# Patient Record
Sex: MALE | Employment: FULL TIME | URBAN - METROPOLITAN AREA
[De-identification: names, ages, dates, MRNs, and addresses within clinical notes are randomized per-mention and may not be internally consistent; named-entity substitution may affect disease eponyms.]

---

## 2018-09-02 ENCOUNTER — HOSPITAL ENCOUNTER (EMERGENCY)
Age: 38
Discharge: HOME | End: 2018-09-02
Payer: COMMERCIAL

## 2018-09-02 ENCOUNTER — APPOINTMENT (OUTPATIENT)
Dept: CT IMAGING | Age: 38
End: 2018-09-02
Payer: COMMERCIAL

## 2018-09-02 VITALS
WEIGHT: 184 LBS | HEIGHT: 66 IN | RESPIRATION RATE: 16 BRPM | OXYGEN SATURATION: 99 % | TEMPERATURE: 98.2 F | SYSTOLIC BLOOD PRESSURE: 121 MMHG | HEART RATE: 74 BPM | DIASTOLIC BLOOD PRESSURE: 71 MMHG | BODY MASS INDEX: 29.57 KG/M2

## 2018-09-02 DIAGNOSIS — M54.6 ACUTE MIDLINE THORACIC BACK PAIN: Primary | ICD-10-CM

## 2018-09-02 DIAGNOSIS — R20.2 LEFT HAND PARESTHESIA: ICD-10-CM

## 2018-09-02 DIAGNOSIS — H53.8 BLURRED VISION, BILATERAL: ICD-10-CM

## 2018-09-02 LAB
ALBUMIN SERPL-MCNC: 3.9 G/DL (ref 3.5–5)
ALBUMIN/GLOB SERPL: 1.3 {RATIO} (ref 1.2–3.5)
ALP SERPL-CCNC: 77 U/L (ref 50–136)
ALT SERPL-CCNC: 26 U/L (ref 12–65)
ANION GAP SERPL CALC-SCNC: 4 MMOL/L (ref 7–16)
AST SERPL-CCNC: 16 U/L (ref 15–37)
BASOPHILS # BLD: 0 K/UL (ref 0–0.2)
BASOPHILS NFR BLD: 1 % (ref 0–2)
BILIRUB SERPL-MCNC: 0.3 MG/DL (ref 0.2–1.1)
BUN SERPL-MCNC: 15 MG/DL (ref 6–23)
CALCIUM SERPL-MCNC: 8.7 MG/DL (ref 8.3–10.4)
CHLORIDE SERPL-SCNC: 106 MMOL/L (ref 98–107)
CO2 SERPL-SCNC: 30 MMOL/L (ref 21–32)
CREAT SERPL-MCNC: 0.93 MG/DL (ref 0.8–1.5)
DIFFERENTIAL METHOD BLD: NORMAL
EOSINOPHIL # BLD: 0.1 K/UL (ref 0–0.8)
EOSINOPHIL NFR BLD: 1 % (ref 0.5–7.8)
ERYTHROCYTE [DISTWIDTH] IN BLOOD BY AUTOMATED COUNT: 12.1 %
GLOBULIN SER CALC-MCNC: 3.1 G/DL (ref 2.3–3.5)
GLUCOSE SERPL-MCNC: 111 MG/DL (ref 65–100)
HCT VFR BLD AUTO: 41.2 % (ref 41.1–50.3)
HGB BLD-MCNC: 13.9 G/DL (ref 13.6–17.2)
IMM GRANULOCYTES # BLD: 0 K/UL (ref 0–0.5)
IMM GRANULOCYTES NFR BLD AUTO: 0 % (ref 0–5)
LYMPHOCYTES # BLD: 1.3 K/UL (ref 0.5–4.6)
LYMPHOCYTES NFR BLD: 19 % (ref 13–44)
MAGNESIUM SERPL-MCNC: 2.2 MG/DL (ref 1.8–2.4)
MCH RBC QN AUTO: 28.8 PG (ref 26.1–32.9)
MCHC RBC AUTO-ENTMCNC: 33.7 G/DL (ref 31.4–35)
MCV RBC AUTO: 85.3 FL (ref 79.6–97.8)
MONOCYTES # BLD: 0.7 K/UL (ref 0.1–1.3)
MONOCYTES NFR BLD: 10 % (ref 4–12)
NEUTS SEG # BLD: 4.7 K/UL (ref 1.7–8.2)
NEUTS SEG NFR BLD: 69 % (ref 43–78)
NRBC # BLD: 0 K/UL (ref 0–0.2)
PLATELET # BLD AUTO: 240 K/UL (ref 150–450)
PMV BLD AUTO: 11.4 FL (ref 9.4–12.3)
POTASSIUM SERPL-SCNC: 4.2 MMOL/L (ref 3.5–5.1)
PROT SERPL-MCNC: 7 G/DL (ref 6.3–8.2)
RBC # BLD AUTO: 4.83 M/UL (ref 4.23–5.6)
SODIUM SERPL-SCNC: 140 MMOL/L (ref 136–145)
WBC # BLD AUTO: 6.8 K/UL (ref 4.3–11.1)

## 2018-09-02 PROCEDURE — 70450 CT HEAD/BRAIN W/O DYE: CPT

## 2018-09-02 PROCEDURE — 72128 CT CHEST SPINE W/O DYE: CPT

## 2018-09-02 PROCEDURE — 81003 URINALYSIS AUTO W/O SCOPE: CPT

## 2018-09-02 PROCEDURE — 85025 COMPLETE CBC W/AUTO DIFF WBC: CPT

## 2018-09-02 PROCEDURE — 80053 COMPREHEN METABOLIC PANEL: CPT

## 2018-09-02 PROCEDURE — 99284 EMERGENCY DEPT VISIT MOD MDM: CPT

## 2018-09-02 PROCEDURE — 83735 ASSAY OF MAGNESIUM: CPT

## 2018-09-02 RX ORDER — CYCLOBENZAPRINE HCL 10 MG
10 TABLET ORAL
Qty: 14 TAB | Refills: 0 | Status: SHIPPED | OUTPATIENT
Start: 2018-09-02 | End: 2018-09-02

## 2018-09-02 RX ORDER — PREDNISONE 10 MG/1
TABLET ORAL
Qty: 21 TAB | Refills: 0 | Status: SHIPPED | OUTPATIENT
Start: 2018-09-02

## 2018-09-02 RX ORDER — CYCLOBENZAPRINE HCL 10 MG
10 TABLET ORAL
Qty: 14 TAB | Refills: 0 | Status: SHIPPED | OUTPATIENT
Start: 2018-09-02

## 2018-09-02 RX ORDER — PREDNISONE 10 MG/1
TABLET ORAL
Qty: 21 TAB | Refills: 0 | Status: SHIPPED | OUTPATIENT
Start: 2018-09-02 | End: 2018-09-02

## 2018-09-02 NOTE — ED NOTES
Patient complaining of mid to lower back pain for \"few days\", however this morning woke up around 0500 and noted some numbness and tingling to left hand.

## 2018-09-02 NOTE — DISCHARGE INSTRUCTIONS
Learning About Relief for Back Pain  What is back tension and strain? Back strain happens when you overstretch, or pull, a muscle in your back. You may hurt your back in an accident or when you exercise or lift something. Most back pain will get better with rest and time. You can take care of yourself at home to help your back heal.  What can you do first to relieve back pain? When you first feel back pain, try these steps:  · Walk. Take a short walk (10 to 20 minutes) on a level surface (no slopes, hills, or stairs) every 2 to 3 hours. Walk only distances you can manage without pain, especially leg pain. · Relax. Find a comfortable position for rest. Some people are comfortable on the floor or a medium-firm bed with a small pillow under their head and another under their knees. Some people prefer to lie on their side with a pillow between their knees. Don't stay in one position for too long. · Try heat or ice. Try using a heating pad on a low or medium setting, or take a warm shower, for 15 to 20 minutes every 2 to 3 hours. Or you can buy single-use heat wraps that last up to 8 hours. You can also try an ice pack for 10 to 15 minutes every 2 to 3 hours. You can use an ice pack or a bag of frozen vegetables wrapped in a thin towel. There is not strong evidence that either heat or ice will help, but you can try them to see if they help. You may also want to try switching between heat and cold. · Take pain medicine exactly as directed. ¨ If the doctor gave you a prescription medicine for pain, take it as prescribed. ¨ If you are not taking a prescription pain medicine, ask your doctor if you can take an over-the-counter medicine. What else can you do? · Stretch and exercise. Exercises that increase flexibility may relieve your pain and make it easier for your muscles to keep your spine in a good, neutral position. And don't forget to keep walking. · Do self-massage.  You can use self-massage to unwind after work or school or to energize yourself in the morning. You can easily massage your feet, hands, or neck. Self-massage works best if you are in comfortable clothes and are sitting or lying in a comfortable position. Use oil or lotion to massage bare skin. · Reduce stress. Back pain can lead to a vicious [place]: Distress about the pain tenses the muscles in your back, which in turn causes more pain. Learn how to relax your mind and your muscles to lower your stress. Where can you learn more? Go to http://nilay-breanna.info/. Enter S373 in the search box to learn more about \"Learning About Relief for Back Pain. \"  Current as of: March 21, 2017  Content Version: 11.5  © 4034-2573 [employer] Care instructions adapted under license by [employer] (which disclaims liability or warranty for this information). If you have questions about a medical condition or this instruction, always ask your healthcare professional. [place] [address] any warranty or liability for your use of this information. Numbness and Tingling: Care Instructions  Your Care Instructions    Many things can cause numbness or tingling. Swelling may put pressure on a nerve. This could cause you to lose feeling or have a pins-and-needles sensation on part of your body. Nerves may be damaged from trauma, toxins, or diseases, such as diabetes or multiple sclerosis (MS). Sometimes, though, the cause is not clear. If there is no clear reason for your symptoms, and you are not having any other symptoms, your doctor may suggest watching and waiting for a while to see if the numbness or tingling goes away on its own. Your doctor may want you to have blood or nerve tests to find the cause of your symptoms. Follow-up care is a key part of your treatment and safety. Be sure to make and go to all appointments, and call your doctor if you are having problems.  It's also a good idea to know your test results and keep a list of the medicines you take. How can you care for yourself at home? · If your doctor prescribes medicine, take it exactly as directed. Call your doctor if you think you are having a problem with your medicine. · If you have any swelling, put ice or a cold pack on the area for 10 to 20 minutes at a time. Put a thin cloth between the ice and your skin. When should you call for help? Call 911 anytime you think you may need emergency care. For example, call if:    · You have weakness, numbness, or tingling in both legs.     · You lose bowel or bladder control.     · You have symptoms of a stroke. These may include:  ¨ Sudden numbness, tingling, weakness, or loss of movement in your face, arm, or leg, especially on only one side of your body. ¨ Sudden vision changes. ¨ Sudden trouble speaking. ¨ Sudden confusion or trouble understanding simple statements. ¨ Sudden problems with walking or balance. ¨ A sudden, severe headache that is different from past headaches.    Watch closely for changes in your health, and be sure to contact your doctor if you have any problems, or if:    · You do not get better as expected. Where can you learn more? Go to http://nilay-breanna.info/. Enter N083 in the search box to learn more about \"Numbness and Tingling: Care Instructions. \"  Current as of: September 10, 2017  Content Version: 11.7  © 4091-0937 [employer]. Care instructions adapted under license by [employer] (which disclaims liability or warranty for this information). If you have questions about a medical condition or this instruction, always ask your healthcare professional. [address] any warranty or liability for your use of this information. Numbness and Tingling: Care Instructions  Your Care Instructions    Many things can cause numbness or tingling. Swelling may put pressure on a nerve.  This could cause you to lose feeling or have a pins-and-needles sensation on part of your body. Nerves may be damaged from trauma, toxins, or diseases, such as diabetes or multiple sclerosis (MS). Sometimes, though, the cause is not clear. If there is no clear reason for your symptoms, and you are not having any other symptoms, your doctor may suggest watching and waiting for a while to see if the numbness or tingling goes away on its own. Your doctor may want you to have blood or nerve tests to find the cause of your symptoms. Follow-up care is a key part of your treatment and safety. Be sure to make and go to all appointments, and call your doctor if you are having problems. It's also a good idea to know your test results and keep a list of the medicines you take. How can you care for yourself at home? · If your doctor prescribes medicine, take it exactly as directed. Call your doctor if you think you are having a problem with your medicine. · If you have any swelling, put ice or a cold pack on the area for 10 to 20 minutes at a time. Put a thin cloth between the ice and your skin. When should you call for help? Call 911 anytime you think you may need emergency care. For example, call if:    · You have weakness, numbness, or tingling in both legs.     · You lose bowel or bladder control.     · You have symptoms of a stroke. These may include:  ¨ Sudden numbness, tingling, weakness, or loss of movement in your face, arm, or leg, especially on only one side of your body. ¨ Sudden vision changes. ¨ Sudden trouble speaking. ¨ Sudden confusion or trouble understanding simple statements. ¨ Sudden problems with walking or balance. ¨ A sudden, severe headache that is different from past headaches.    Watch closely for changes in your health, and be sure to contact your doctor if you have any problems, or if:    · You do not get better as expected. Where can you learn more?   Go to http://nilay-breanna.info/. Enter W423 in the search box to learn more about \"Numbness and Tingling: Care Instructions. \"  Current as of: September 10, 2017  Content Version: 11.7  © 4219-0178 [employer]. Care instructions adapted under license by [employer] (which disclaims liability or warranty for this information). If you have questions about a medical condition or this instruction, always ask your healthcare professional. [address] any warranty or liability for your use of this information. Numbness and Tingling: Care Instructions  Your Care Instructions    Many things can cause numbness or tingling. Swelling may put pressure on a nerve. This could cause you to lose feeling or have a pins-and-needles sensation on part of your body. Nerves may be damaged from trauma, toxins, or diseases, such as diabetes or multiple sclerosis (MS). Sometimes, though, the cause is not clear. If there is no clear reason for your symptoms, and you are not having any other symptoms, your doctor may suggest watching and waiting for a while to see if the numbness or tingling goes away on its own. Your doctor may want you to have blood or nerve tests to find the cause of your symptoms. Follow-up care is a key part of your treatment and safety. Be sure to make and go to all appointments, and call your doctor if you are having problems. It's also a good idea to know your test results and keep a list of the medicines you take. How can you care for yourself at home? · If your doctor prescribes medicine, take it exactly as directed. Call your doctor if you think you are having a problem with your medicine. · If you have any swelling, put ice or a cold pack on the area for 10 to 20 minutes at a time. Put a thin cloth between the ice and your skin. When should you call for help? Call 911 anytime you think you may need emergency care.  For example, call if:    · You have weakness, numbness, or tingling in both legs.     · You lose bowel or bladder control.     · You have symptoms of a stroke. These may include:  ¨ Sudden numbness, tingling, weakness, or loss of movement in your face, arm, or leg, especially on only one side of your body. ¨ Sudden vision changes. ¨ Sudden trouble speaking. ¨ Sudden confusion or trouble understanding simple statements. ¨ Sudden problems with walking or balance. ¨ A sudden, severe headache that is different from past headaches.    Watch closely for changes in your health, and be sure to contact your doctor if you have any problems, or if:    · You do not get better as expected. Where can you learn more? Go to http://nilay-breanna.info/. Enter X514 in the search box to learn more about \"Numbness and Tingling: Care Instructions. \"  Current as of: September 10, 2017  Content Version: 11.7  © 5651-0480 [employer]. Care instructions adapted under license by [employer] (which disclaims liability or warranty for this information). If you have questions about a medical condition or this instruction, always ask your healthcare professional. [place] [address] any warranty or liability for your use of this information.

## 2018-09-02 NOTE — ED NOTES
I have reviewed discharge instructions with the patient. The patient verbalized understanding. Patient left ED via Discharge Method: ambulatory to Home with (self). Opportunity for questions and clarification provided. Patient given 2 scripts. To continue your aftercare when you leave the hospital, you may receive an automated call from our care team to check in on how you are doing. This is a free service and part of our promise to provide the best care and service to meet your aftercare needs.  If you have questions, or wish to unsubscribe from this service please call [phone]. Thank you for Choosing our [facility].

## 2018-09-02 NOTE — ED PROVIDER NOTES
HPI Comments: 68-year-old complaining of mid back pain and left hand numbness. He also complains of headache blurred vision onset and gradual last 2 weeks. No injuries no MVC. 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 Patient is ambidextrous. Patient is a 40 y.o. male presenting with back pain. The history is provided by the patient. Back Pain This is a new problem. The current episode started more than 1 week ago. The problem has not changed since onset. The problem occurs constantly. Patient reports not work related injury. The pain is associated with no known injury. The pain is present in the thoracic spine. The pain is moderate. The symptoms are aggravated by bending. Associated symptoms include paresthesias. Pertinent negatives include no chest pain, no fever, no numbness, no weight loss, no abdominal pain, no abdominal swelling, no bowel incontinence and no perianal numbness. He has tried nothing for the symptoms. The patient's surgical history non-contributory History reviewed. No pertinent past medical history. History reviewed. No pertinent surgical history. History reviewed. No pertinent family history. Social History Social History  Marital status:  Spouse name: N/A  
 Number of children: N/A  
 Years of education: N/A Occupational History  Not on file. Social History Main Topics  Smoking status: Light Tobacco Smoker  Smokeless tobacco: Never Used  Alcohol use Yes Comment: social  
 Drug use: No  
 Sexual activity: Not on file Other Topics Concern  Not on file Social History Narrative ** Merged History Encounter ** ALLERGIES: Review of patient's allergies indicates no known allergies. Review of Systems Constitutional: Negative. Negative for activity change, fever and weight loss. HENT: Negative. Eyes: Negative. Respiratory: Negative. Cardiovascular: Negative. Negative for chest pain. Gastrointestinal: Negative. Negative for abdominal pain and bowel incontinence. Genitourinary: Negative. Musculoskeletal: Positive for back pain. Skin: Negative. Neurological: Positive for paresthesias. Negative for numbness. Psychiatric/Behavioral: Negative. All other systems reviewed and are negative. Vitals:  
 09/02/18 1312 BP: 128/80 Pulse: 70 Resp: 16 Temp: 97.7 °F (36.5 °C) SpO2: 99% Weight: 83.5 kg (184 lb) Height: 5' 6\" (1.676 m) Physical Exam  
Constitutional: He is oriented to person, place, and time. He appears well-developed and well-nourished. No distress. HENT:  
Head: Normocephalic and atraumatic. Right Ear: External ear normal.  
Left Ear: External ear normal.  
Nose: Nose normal.  
Mouth/Throat: Oropharynx is clear and moist. No oropharyngeal exudate. Eyes: Conjunctivae and EOM are normal. Pupils are equal, round, and reactive to light. Right eye exhibits no discharge. Left eye exhibits no discharge. No scleral icterus. Neck: Normal range of motion. Neck supple. No JVD present. No tracheal deviation present. Cardiovascular: Normal rate, regular rhythm and intact distal pulses. Pulmonary/Chest: Effort normal and breath sounds normal. No stridor. No respiratory distress. He has no wheezes. He exhibits no tenderness. Abdominal: Soft. Bowel sounds are normal. He exhibits no distension and no mass. There is no tenderness. Musculoskeletal: Normal range of motion. He exhibits no edema or tenderness. Neurological: He is alert and oriented to person, place, and time. No cranial nerve deficit. Skin: Skin is warm and dry. No rash noted. He is not diaphoretic. No erythema. No pallor. Psychiatric: He has a normal mood and affect. His behavior is normal. Thought content normal.  
Nursing note and vitals reviewed. MDM 
 
 
ED Course Procedures

## 2019-01-14 ENCOUNTER — HOSPITAL ENCOUNTER (OUTPATIENT)
Dept: MRI IMAGING | Age: 39
Discharge: HOME | End: 2019-01-14
Attending: INTERNAL MEDICINE
Payer: COMMERCIAL

## 2019-01-14 DIAGNOSIS — M54.2 NECK PAIN: ICD-10-CM

## 2019-01-14 DIAGNOSIS — M54.12 CERVICAL RADICULOPATHY: ICD-10-CM

## 2019-01-14 DIAGNOSIS — M54.6 THORACIC SPINE PAIN: ICD-10-CM

## 2019-01-14 PROCEDURE — 72141 MRI NECK SPINE W/O DYE: CPT

## 2019-01-14 PROCEDURE — 72146 MRI CHEST SPINE W/O DYE: CPT

## 2019-05-07 ENCOUNTER — HOSPITAL ENCOUNTER (OUTPATIENT)
Dept: PHYSICAL THERAPY | Age: 39
Discharge: HOME | End: 2019-05-07
Payer: COMMERCIAL

## 2019-05-07 PROCEDURE — 97161 PT EVAL LOW COMPLEX 20 MIN: CPT

## 2019-05-07 PROCEDURE — 97014 ELECTRIC STIMULATION THERAPY: CPT

## 2019-05-09 NOTE — PROGRESS NOTES
Anat Ash  : 1980  Primary: [address]  Secondary:  [address]  at [facility]  [address], [address], [address] [address]  [phone]   [phone]        OUTPATIENT PHYSICAL THERAPY: Daily Treatment Note 2019  Pre-treatment Symptoms/Complaints:  Patient reports fairly constant upper and mid back pain, rated as 8/10 at worst. He reports prolonged activity, bending, and lifting will increase his symptoms. Pain: Initial: Pain Intensity 1: 5  Pain Location 1: Back  Post Session:  3-4/10   Medications Last Reviewed:  2019    Updated Objective Findings:  See evaluation note from today   TREATMENT:     MODALITIES: (15 minutes): *  Electrical Stimulation Therapy (IFC set-up, intersecting at T4-6 region, patient prone) was provided with intensity adjusted throughout treatment to patient tolerance. Patient responded well to treatment, skin clear afterwards     THERAPEUTIC EXERCISE: (-- minutes):  Exercises per grid below to improve mobility and strength. Required minimal verbal cues to promote proper body alignment, promote proper body posture and promote proper body mechanics. Progressed complexity of movement as indicated. Date:   Date:   Date:     Activity/Exercise Parameters Parameters Parameters                                                 Treatment/Session Summary:    · Response to Treatment:  Patient verbalized understanding of plan of care and responded well to e-stim today, noting decreased pain.   · Communication/Consultation:  plan of care discussion,   · Equipment provided today:  None today  · Recommendations/Intent for next treatment session: Next visit will focus on thoracic mobility and scapular/postural strengthening   Treatment Plan of Care Effective Dates:  2019 TO 2019   Total Treatment Billable Duration:  40 minutes - 25 minute initial evaluation and 15 minute electrical stimulation unattended   PT Patient Time In/Time Out  Time In: 0815  Time Out: 0900  Nya Hollins, PT, DPT    Future Appointments   Date Time Provider [place]   5/13/2019  9:00 AM [facility]   5/15/2019  8:15 AM [place] Prows[facility]   5/20/2019  8:15 AM [facility]   5/22/2019  8:15 AM Nathan Rainesws, PT, DPT [facility]   5/29/2019  8:15 AM [facility]ws[facility]   6/3/2019  8:15 AM [facility]owalski, [facility]   6/5/2019  8:15 AM Terrence Gilbert PT, DPT [facility]

## 2019-05-09 NOTE — THERAPY EVALUATION
Fazal Lopez  : 1980  Primary: [address]  Secondary:  [address]  at [facility]  [address], [address], [place]  [phone]   [phone]          OUTPATIENT PHYSICAL THERAPY:Initial Assessment 2019   ICD-10: Treatment Diagnosis: Pain in thoracic spine (M54.6)  Precautions/Allergies:   Patient has no known allergies. MD Orders: evaluate and treat MEDICAL/REFERRING DIAGNOSIS:  Pain in thoracic spine [M54.6]   DATE OF ONSET: Chronic   REFERRING PHYSICIAN: Arielle Brownlee[address]: unknown      INITIAL ASSESSMENT:  Mr. Kym Rodriguez presents in therapy today with chronic reports of upper and mid central back pain, which refers to B/L scapular region. He will benefit from skilled PT in order to improve pain and mobility in the spine for optimum performance of ADLs and work duties. PROBLEM LIST (Impacting functional limitations):  1. Decreased Strength  2. Decreased ADL/Functional Activities  3. Increased Pain  4. Decreased Activity Tolerance  5. Decreased Flexibility/Joint Mobility  6. Decreased Knowledge of Precautions  7. Decreased [place] with Home Exercise Program INTERVENTIONS PLANNED: (Treatment may consist of any combination of the following)  1. Cold  2. Electrical Stimulation  3. Heat  4. Home Exercise Program (HEP)  5. Manual Therapy  6. Range of Motion (ROM)  7. Therapeutic Activites  8. Therapeutic Exercise/Strengthening   TREATMENT PLAN:  Effective Dates: 2019 TO 2019 (60 days). Frequency/Duration: 2 times a week for 60 Day(s)  GOALS: (Goals have been discussed and agreed upon with patient.)    SHORT-TERM FUNCTIONAL GOALS: Time Frame: 3 weeks  1. Patient will be compliant with HEP focused on thoracic mobility and strength/ROM. 2.  Patient will rate mid back pain no greater than 6/10 at worst for improved tolerance to daily and work activities.     DISCHARGE GOALS: Time Frame: 6 weeks  1. Patient will be independent with comprehensive HEP focused on lumbar stabilization and core strengthening. 2.  Patient will rate mid back pain no greater than 3/10 and which does not significantly interfere with daily or work duties. 3.  Patient will demonstrate thoracic AROM to be [facility] for improved safety with functional mobility. REHABILITATION POTENTIAL FOR STATED GOALS: Good       Rehabilitation Potential For Stated Goals: Good  Regarding Mari Rob's therapy, I certify that the treatment plan above will be carried out by a therapist or under their direction. Thank you for this referral,  Deann Dinero, PT, DPT     Referring Physician Signature: Km Walsh,               Date                    The information in this section was collected on 5-7-19 (except where otherwise noted). HISTORY:   History of Present Injury/Illness (Reason for Referral):  Patient reports chronic upper and mid back pain, beginning years ago from unknown cause. Had recent negative x-ray imaging for the spine. He was diagnosed with muscle strain in the upper and mid back. Past Medical History/Comorbidities:   Mr. Sheryl Up  has no past medical history on file. Mr. Sheryl Up  has no past surgical history on file. Social History/Living Environment:     Independent   Prior Level of Function/Work/Activity:  Works as hart   Personal Factors:          Sex:  male        Age:  45 y.o. Profession:  Frequent bending as hart      Ambulatory/Rehab Services H2 Model Falls Risk Assessment    Risk Factors:       (1)  Gender [Male] Ability to Rise from Chair:       (0)  Ability to rise in a single movement    Falls Prevention Plan:       No modifications necessary   Total: (5 or greater = High Risk): 1    ©2010 [facility] "[employer] All Rights Reserved. [facility] Patent #[id number].  Federal Law prohibits the replication, distribution or use without written permission from [employer]     Current Medications:       Current Outpatient Medications:     predniSONE (STERAPRED DS) 10 mg dose pack, Please take as directed on package. Please clarify any questions with the Pharmacist., Disp: 21 Tab, Rfl: 0    cyclobenzaprine (FLEXERIL) 10 mg tablet, Take 1 Tab by mouth three (3) times daily as needed for Muscle Spasm(s). , Disp: 14 Tab, Rfl: 0   Date Last Reviewed:  5/9/2019     Number of Personal Factors/Comorbidities that affect the Plan of Care: 0: LOW COMPLEXITY   EXAMINATION:   Observation/Orthostatic Postural Assessment:          Patient demonstrates forward lean and increased thoracic kyphosis, bilaterally rounded shoulders and forward head  Palpation:          Tenderness with central PA mobilizations T4-8; bilaterally right paraspinals at these levels   ROM:          Thoracic AROM is 50% in all planes; lumbar AROM is [place]  Strength:          Thoracic side bend and rotation are 4/5   Body Structures Involved:  1. Bones  2. Joints  3. Muscles Body Functions Affected:  1. Sensory/Pain  2. Movement Related Activities and Participation Affected:  1. General Tasks and Demands  2. Mobility  3. Domestic Life  4. Interpersonal Interactions and Relationships  5. Community, Social and [place]   Number of elements (examined above) that affect the Plan of Care: 1-2: LOW COMPLEXITY   CLINICAL PRESENTATION:   Presentation: Stable and uncomplicated: LOW COMPLEXITY   CLINICAL DECISION MAKING:   Outcome Measure: Tool Used: Modified Oswestry Low Back Pain Questionnaire  Score:  Initial: 21/50  Most Recent: X/50 (Date: -- )   Interpretation of Score: Each section is scored on a 0-5 scale, 5 representing the greatest disability. The scores of each section are added together for a total score of 50. Medical Necessity:   · Patient is expected to demonstrate progress in strength and range of motion to improve safety during functional mobility.   Reason for Services/Other Comments:  · Patient continues to require skilled intervention due to not reaching long term goals.    Use of outcome tool(s) and clinical judgement create a POC that gives a: Clear prediction of patient's progress: LOW COMPLEXITY

## 2019-05-13 ENCOUNTER — HOSPITAL ENCOUNTER (OUTPATIENT)
Dept: PHYSICAL THERAPY | Age: 39
Discharge: HOME | End: 2019-05-13
Payer: COMMERCIAL

## 2019-05-13 PROCEDURE — 97014 ELECTRIC STIMULATION THERAPY: CPT

## 2019-05-13 PROCEDURE — 97110 THERAPEUTIC EXERCISES: CPT

## 2019-05-13 NOTE — PROGRESS NOTES
Sal Portillo  : 1980  Primary: [address]*  Secondary:  [address] at [address], [place] [address]  [phone]   [phone]        OUTPATIENT PHYSICAL THERAPY: Daily Treatment Note 2019  Pre-treatment Symptoms/Complaints:  Patient reports his back hurts when he is busy and working hard. Pain: Initial: Pain Intensity 1: 5  Pain Location 1: Back  Post Session:  3-4/10   Medications Last Reviewed:  2019    Updated Objective Findings:  See evaluation note from today   TREATMENT:     MODALITIES: (10 minutes): *  Electrical Stimulation Therapy (IFC set-up, intersecting at T4-6 region, patient prone) was provided with intensity adjusted throughout treatment to patient tolerance. Patient responded well to treatment, skin clear afterwards     THERAPEUTIC EXERCISE: (30 minutes):  Exercises per grid below to improve mobility and strength. Required minimal verbal cues to promote proper body alignment, promote proper body posture and promote proper body mechanics. Progressed complexity of movement as indicated. Date:  19 Date:   Date:     Activity/Exercise Parameters Parameters Parameters   UBE   8 minutes  / level 1     Thoracic rotation    Seated   x10      Thoracic extension    Seated   x10      Rows   7 lb cables  2x10     Shoulder extension    7 lb cables  2x10                         Treatment/Session Summary:    · Response to Treatment:  Patient did well today with gentle focus on increases thoracic motion and postural strength.    · Communication/Consultation:  plan of care discussion,   · Equipment provided today:  None today  · Recommendations/Intent for next treatment session: Next visit will focus on thoracic mobility and scapular/postural strengthening   Treatment Plan of Care Effective Dates:  2019 TO 2019   Total Treatment Billable Duration:  40 minutes - 30 minutes therapeutic exercise and 10 minute electrical stimulation unattended   PT Patient Time In/Time Out  Time In: 0900  Time Out: 0945  Troy Rebollar, PT, DPT    Future Appointments   Date Time Provider Kiana Betancourt   5/15/2019  8:15 AM Aramis Felder PT, DPT [facility]   5/20/2019  8:15 AM [facility]   5/22/2019  8:15 AM [facility]   5/29/2019  8:15 AM [facility]   6/3/2019  8:15 AM [facility]   6/5/2019  8:15 AM Vincent Mejia PT, DPT [facility]

## 2019-05-15 ENCOUNTER — HOSPITAL ENCOUNTER (OUTPATIENT)
Dept: PHYSICAL THERAPY | Age: 39
Discharge: HOME | End: 2019-05-15
Payer: COMMERCIAL

## 2019-05-15 PROCEDURE — 97110 THERAPEUTIC EXERCISES: CPT

## 2019-05-15 PROCEDURE — 97014 ELECTRIC STIMULATION THERAPY: CPT

## 2019-05-15 NOTE — PROGRESS NOTES
Idris Espitia  : 1980  Primary: [address]  Secondary:  [address]  at [facility]  [address], [address], [place] [address]  [phone]   [phone]        OUTPATIENT PHYSICAL THERAPY: Daily Treatment Note 5/15/2019  Pre-treatment Symptoms/Complaints:  Patient reports his back feels a little better. Pain: Initial: Pain Intensity 1: 4  Pain Location 1: Back  Post Session:  3-4/10   Medications Last Reviewed:  5/15/2019    Updated Objective Findings:  See evaluation note from today   TREATMENT:     MODALITIES: (10 minutes): *  Electrical Stimulation Therapy (IFC set-up, intersecting at T4-6 region, patient prone) was provided with intensity adjusted throughout treatment to patient tolerance. Patient responded well to treatment, skin clear afterwards     THERAPEUTIC EXERCISE: (30 minutes):  Exercises per grid below to improve mobility and strength. Required minimal verbal cues to promote proper body alignment, promote proper body posture and promote proper body mechanics. Progressed complexity of movement as indicated. Date:  19 Date:  5-15-19 Date:     Activity/Exercise Parameters Parameters Parameters   UBE   8 minutes  4/4 level 1 8 minutes  4/4 level 1    Thoracic rotation    Seated   x10  Seated   x10     Thoracic extension    Seated   x10  Seated   x10     Rows   7 lb cables  2x10 7 lb cables  2x10    Shoulder extension    7 lb cables  2x10 7 lb cables  2x10                        Treatment/Session Summary:    · Response to Treatment:  Patient did well today with gentle focus on increases thoracic motion and postural strength.    · Communication/Consultation:  plan of care discussion,   · Equipment provided today:  None today  · Recommendations/Intent for next treatment session: Next visit will focus on thoracic mobility and scapular/postural strengthening   Treatment Plan of Care Effective Dates:  2019 TO 2019   Total Treatment Billable Duration:  40 minutes - 30 minutes therapeutic exercise and 10 minute electrical stimulation unattended   PT Patient Time In/Time Out  Time In: 0815  Time Out: 0900  Warner Cabrera, PT, DPT    Future Appointments   Date Time Provider Kiana Betancourt   5/20/2019  8:15 AM Parth Collins, PT, DPT [facility]   5/22/2019  8:15 AM Parth Collins PT, DPT [facility]   5/29/2019  8:15 [facility]   6/3/2019  8:15 AM [facility] Collins [facility]   6/5/2019  8:15 AM Monica Tubbs, PT, DPT [facility]

## 2019-05-22 ENCOUNTER — HOSPITAL ENCOUNTER (OUTPATIENT)
Dept: PHYSICAL THERAPY | Age: 39
Discharge: HOME | End: 2019-05-22
Payer: COMMERCIAL

## 2019-05-22 PROCEDURE — 97110 THERAPEUTIC EXERCISES: CPT

## 2019-05-22 NOTE — PROGRESS NOTES
Nael Ania  : 1980  Primary: [address]*  Secondary:  [address]  at [facility]  [address], [address], [place] [address]  [phone]   [phone]        OUTPATIENT PHYSICAL THERAPY: Daily Treatment Note 2019  Pre-treatment Symptoms/Complaints:  Patient reports his L flank/lateral rib region is sore today, and states it is likely from his sleeping position. Pain: Initial: Pain Intensity 1: 5  Pain Location 1: Rib cage  Post Session:  3-4/10   Medications Last Reviewed:  2019    Updated Objective Findings:  None Today   TREATMENT:     MODALITIES: (0 minutes): *  Electrical Stimulation Therapy (IFC set-up, intersecting at T4-6 region, patient prone) was provided with intensity adjusted throughout treatment to patient tolerance. Patient responded well to treatment, skin clear afterwards (not today)    THERAPEUTIC EXERCISE: (40 minutes):  Exercises per grid below to improve mobility and strength. Required minimal verbal cues to promote proper body alignment, promote proper body posture and promote proper body mechanics. Progressed complexity of movement as indicated. Date:  19 Date:  5-15-19 Date:  19   Activity/Exercise Parameters Parameters Parameters   UBE   8 minutes  4/4 level 1 8 minutes  4/4 level 1 8 minutes  4/4 level 1   Thoracic rotation    Seated   x10  Seated   x10  Seated   x10    Thoracic extension    Seated   x10  Seated   x10  Seated   x10    Rows   7 lb cables  2x10 7 lb cables  2x10    Shoulder extension    7 lb cables  2x10 7 lb cables  2x10    Thoracic side bend     Seated   x10    \"open book\"     Each direction S/L  x10 each     Patient received moist heat to lower back/L sided flank, 5 minutes    Treatment/Session Summary:    · Response to Treatment:  Patient reported improved improved symptoms at end of session. Instructed to continue with lateral flexion stretch over the next few days. · Communication/Consultation:  plan of care discussion,   · Equipment provided today:  None today  · Recommendations/Intent for next treatment session: Next visit will focus on thoracic mobility and scapular/postural strengthening   Treatment Plan of Care Effective Dates:  5/7/2019 TO 7/8/2019   Total Treatment Billable Duration:  40 minutes - 40 minutes therapeutic exercise  PT Patient Time In/Time Out  Time In: 0815  Time Out: 0900  Barbara Espinoza, PT, DPT    Future Appointments   Date Time Provider Kiana Betancourt   5/29/2019  8:15 AM Agustin Lema, PT, DPT [facility]   6/3/2019  8:15 AM Agustin Lema, PT, DPT [facility]   6/5/2019  8:15 AM Agustin Lema, PT, DPT [facility]

## 2019-05-29 ENCOUNTER — HOSPITAL ENCOUNTER (OUTPATIENT)
Dept: PHYSICAL THERAPY | Age: 39
Discharge: HOME | End: 2019-05-29
Payer: COMMERCIAL

## 2019-05-29 PROCEDURE — 97110 THERAPEUTIC EXERCISES: CPT

## 2019-05-29 NOTE — PROGRESS NOTES
Cindi Cordero  : 1980  Primary: [address]*  Secondary:  [address]  at [facility]  [address], [address], [place] [address]  [phone]   [phone]        OUTPATIENT PHYSICAL THERAPY: Daily Treatment Note 2019  Pre-treatment Symptoms/Complaints:  Patient reports greatest pain today is R sided neck, upper trap and arm. He states it has been irritated for 2-3 days. Pain: Initial: Pain Intensity 1: 5  Pain Location 1: Neck  Pain Orientation 1: Right  Post Session:  3-4/10   Medications Last Reviewed:  2019    Updated Objective Findings:  None Today   TREATMENT:     MODALITIES: (0 minutes): *  Electrical Stimulation Therapy (IFC set-up, intersecting at T4-6 region, patient prone) was provided with intensity adjusted throughout treatment to patient tolerance. Patient responded well to treatment, skin clear afterwards (not today)    THERAPEUTIC EXERCISE: (40 minutes):  Exercises per grid below to improve mobility and strength. Required minimal verbal cues to promote proper body alignment, promote proper body posture and promote proper body mechanics. Progressed complexity of movement as indicated.      Date:  19 Date:  5-15-19 Date:  19 Date  19   Activity/Exercise Parameters Parameters Parameters    UBE   8 minutes  4/4 level 1 8 minutes  4/4 level 1 8 minutes  4/4 level 1 8 minutes  4/4 level 1   Thoracic rotation    Seated   x10  Seated   x10  Seated   x10     Thoracic extension    Seated   x10  Seated   x10  Seated   x10     Rows   7 lb cables  2x10 7 lb cables  2x10     Shoulder extension    7 lb cables  2x10 7 lb cables  2x10     Thoracic side bend     Seated   x10     Pec stretch      Against wall  5x10 seconds   Upper trap stretch      3x30 seconds                 \"open book\"     Each direction S/L  x10 each Each direction S/L  x10 each     Patient received moist heat to R upper trap, 5 minutes, patient seated    Treatment/Session Summary:    · Response to Treatment:  Patient was able to reproduce his R neck and scapular pain with passive upper trap stretching. Instructed to continue this stretch regularly, but to avoid pain during stretch.    · Communication/Consultation:  plan of care discussion,   · Equipment provided today:  None today  · Recommendations/Intent for next treatment session: Next visit will focus on thoracic mobility and scapular/postural strengthening   Treatment Plan of Care Effective Dates:  5/7/2019 TO 7/8/2019   Total Treatment Billable Duration:  40 minutes - 40 minutes therapeutic exercise  PT Patient Time In/Time Out  Time In: 0815  Time Out: 0900  Gennaro Becerra, PT, DPT    Future Appointments   Date Time Provider Kiana Betancourt   6/3/2019  8:15 AM Amira Carlin, PT, DPT [facility]   6/5/2019  8:15 AM Rukhsana Stock, PT, DPT [facility]

## 2023-11-08 ENCOUNTER — HOSPITAL ENCOUNTER (EMERGENCY)
Age: 43
Discharge: HOME | End: 2023-11-08
Attending: EMERGENCY MEDICINE
Payer: COMMERCIAL

## 2023-11-08 VITALS
DIASTOLIC BLOOD PRESSURE: 108 MMHG | HEART RATE: 86 BPM | OXYGEN SATURATION: 97 % | SYSTOLIC BLOOD PRESSURE: 160 MMHG | BODY MASS INDEX: 32.14 KG/M2 | TEMPERATURE: 97.9 F | RESPIRATION RATE: 16 BRPM | HEIGHT: 66 IN | WEIGHT: 200 LBS

## 2023-11-08 DIAGNOSIS — R07.89 ATYPICAL CHEST PAIN: Primary | ICD-10-CM

## 2023-11-08 LAB
ANION GAP SERPL CALC-SCNC: 4 MMOL/L (ref 2–11)
BUN SERPL-MCNC: 10 MG/DL (ref 6–23)
CALCIUM SERPL-MCNC: 8.4 MG/DL (ref 8.3–10.4)
CHLORIDE SERPL-SCNC: 110 MMOL/L (ref 101–110)
CO2 SERPL-SCNC: 28 MMOL/L (ref 21–32)
CREAT SERPL-MCNC: 0.82 MG/DL (ref 0.8–1.5)
ERYTHROCYTE [DISTWIDTH] IN BLOOD BY AUTOMATED COUNT: 12.4 % (ref 11.9–14.6)
GLUCOSE SERPL-MCNC: 129 MG/DL (ref 65–100)
HCT VFR BLD AUTO: 41.4 % (ref 41.1–50.3)
HGB BLD-MCNC: 14.3 G/DL (ref 13.6–17.2)
MCH RBC QN AUTO: 28.9 PG (ref 26.1–32.9)
MCHC RBC AUTO-ENTMCNC: 34.5 G/DL (ref 31.4–35)
MCV RBC AUTO: 83.8 FL (ref 82–102)
NRBC # BLD: 0 K/UL (ref 0–0.2)
PLATELET # BLD AUTO: 283 K/UL (ref 150–450)
PMV BLD AUTO: 11.2 FL (ref 9.4–12.3)
POTASSIUM SERPL-SCNC: 3.9 MMOL/L (ref 3.5–5.1)
RBC # BLD AUTO: 4.94 M/UL (ref 4.23–5.6)
SODIUM SERPL-SCNC: 142 MMOL/L (ref 133–143)
TROPONIN I SERPL HS-MCNC: 9.1 PG/ML (ref 0–14)
WBC # BLD AUTO: 6.2 K/UL (ref 4.3–11.1)

## 2023-11-08 PROCEDURE — 85027 COMPLETE CBC AUTOMATED: CPT

## 2023-11-08 PROCEDURE — 80048 BASIC METABOLIC PNL TOTAL CA: CPT

## 2023-11-08 PROCEDURE — 99283 EMERGENCY DEPT VISIT LOW MDM: CPT

## 2023-11-08 PROCEDURE — 84484 ASSAY OF TROPONIN QUANT: CPT

## 2023-11-08 ASSESSMENT — ENCOUNTER SYMPTOMS
NAUSEA: 0
COUGH: 0
SHORTNESS OF BREATH: 0
VOMITING: 0

## 2023-11-08 ASSESSMENT — PAIN SCALES - GENERAL: PAINLEVEL_OUTOF10: 0

## 2023-11-08 NOTE — DISCHARGE INSTRUCTIONS
Please return to the emerged part with any fevers, vomiting, difficulty breathing, worsening symptoms, or additional concerns. Follow-up with your primary care doctor for reevaluation of your blood pressure as we discussed.

## 2023-11-08 NOTE — ED NOTES
Episode of upper chest  pain that was aprox 1.5 hr ago with some jaw pain. Denies pain at this time.  Denies accompanied shob/dizziness           Clovis Malone RN  11/08/23 2302